# Patient Record
Sex: FEMALE | Race: AMERICAN INDIAN OR ALASKA NATIVE | Employment: FULL TIME | URBAN - NONMETROPOLITAN AREA
[De-identification: names, ages, dates, MRNs, and addresses within clinical notes are randomized per-mention and may not be internally consistent; named-entity substitution may affect disease eponyms.]

---

## 2018-03-14 ENCOUNTER — HOSPITAL ENCOUNTER (EMERGENCY)
Facility: OTHER | Age: 23
Discharge: HOME | End: 2018-03-14

## 2018-03-14 VITALS
HEIGHT: 63 IN | OXYGEN SATURATION: 97 % | RESPIRATION RATE: 18 BRPM | SYSTOLIC BLOOD PRESSURE: 132 MMHG | TEMPERATURE: 99 F | WEIGHT: 190 LBS | HEART RATE: 124 BPM | DIASTOLIC BLOOD PRESSURE: 80 MMHG | BODY MASS INDEX: 33.66 KG/M2

## 2018-03-15 NOTE — ED NOTES
Pt reports she has a throbbing aching pain today that is now just in her neck, her head feels heavy, she has nausea, has taken medications but nothing is helping.  Pt had some pain yesterday but it wasn't as severe as it is today.  She saw a chiropractor today and an hour later her pain started getting worse.  Pain is a stabbing pain in the back of her head that goes to her eyeballs, her head feels like it's going to explode.  This happened before and she got toradol which helped but her pain came back the next day.

## 2018-04-02 DIAGNOSIS — B86 SCABIES: Primary | ICD-10-CM

## 2018-04-02 RX ORDER — PERMETHRIN 50 MG/G
CREAM TOPICAL
Qty: 60 G | Refills: 1 | Status: SHIPPED | OUTPATIENT
Start: 2018-04-02 | End: 2019-02-18

## 2018-04-03 NOTE — PROGRESS NOTES
Here with family. She shows me a rash. Rash on her antecubital spaces, in between fingers and on abd. Appears like scabies. . has same rash, came home from [facility] with the rash.       Lorraine Acosta NP on 4/2/2018 at 8:03 PM

## 2019-02-18 ENCOUNTER — HOSPITAL ENCOUNTER (OUTPATIENT)
Dept: GENERAL RADIOLOGY | Facility: OTHER | Age: 24
Discharge: HOME | End: 2019-02-18
Attending: PHYSICIAN ASSISTANT | Admitting: PHYSICIAN ASSISTANT
Payer: COMMERCIAL

## 2019-02-18 ENCOUNTER — HOSPITAL ENCOUNTER (OUTPATIENT)
Dept: GENERAL RADIOLOGY | Facility: OTHER | Age: 24
End: 2019-02-18
Attending: PHYSICIAN ASSISTANT
Payer: COMMERCIAL

## 2019-02-18 ENCOUNTER — OFFICE VISIT (OUTPATIENT)
Dept: FAMILY MEDICINE | Facility: OTHER | Age: 24
End: 2019-02-18
Attending: PHYSICIAN ASSISTANT
Payer: COMMERCIAL

## 2019-02-18 VITALS
HEART RATE: 84 BPM | DIASTOLIC BLOOD PRESSURE: 68 MMHG | WEIGHT: 215.5 LBS | TEMPERATURE: 98.6 F | BODY MASS INDEX: 36.79 KG/M2 | SYSTOLIC BLOOD PRESSURE: 112 MMHG | HEIGHT: 64 IN

## 2019-02-18 DIAGNOSIS — K59.00 CONSTIPATION, UNSPECIFIED CONSTIPATION TYPE: ICD-10-CM

## 2019-02-18 DIAGNOSIS — M25.512 ACUTE PAIN OF LEFT SHOULDER: Primary | ICD-10-CM

## 2019-02-18 DIAGNOSIS — M25.512 ACUTE PAIN OF LEFT SHOULDER: ICD-10-CM

## 2019-02-18 DIAGNOSIS — R07.89 CHEST WALL PAIN: ICD-10-CM

## 2019-02-18 PROCEDURE — 99214 OFFICE O/P EST MOD 30 MIN: CPT | Performed by: PHYSICIAN ASSISTANT

## 2019-02-18 PROCEDURE — G0463 HOSPITAL OUTPT CLINIC VISIT: HCPCS

## 2019-02-18 PROCEDURE — 71046 X-RAY EXAM CHEST 2 VIEWS: CPT

## 2019-02-18 PROCEDURE — 73030 X-RAY EXAM OF SHOULDER: CPT | Mod: LT

## 2019-02-18 PROCEDURE — G0463 HOSPITAL OUTPT CLINIC VISIT: HCPCS | Mod: 25

## 2019-02-18 RX ORDER — CYCLOBENZAPRINE HCL 5 MG
5 TABLET ORAL 3 TIMES DAILY PRN
Qty: 9 TABLET | Refills: 0 | Status: SHIPPED | OUTPATIENT
Start: 2019-02-18 | End: 2019-02-21

## 2019-02-18 RX ORDER — ACETAMINOPHEN 500 MG
1000 TABLET ORAL
COMMUNITY

## 2019-02-18 ASSESSMENT — MIFFLIN-ST. JEOR: SCORE: 1717.5

## 2019-02-18 ASSESSMENT — PAIN SCALES - GENERAL: PAINLEVEL: EXTREME PAIN (8)

## 2019-02-19 NOTE — PATIENT INSTRUCTIONS
Chest XR negative for abnormality  XR shoulder on left negative for abnormality  Imaging shows gas and bowel in left abdomen - could be referred pain you are feeling in your shoulder  Stomach is non tender on exam today, no nausea, vomiting  Magnesium citrate - for clean out  Push fluids  For shoulder pain, rest shoulder and symptomatic treatments  Ibuprofen 800 mg oral with food 3 times daily, max 2400 mg/day. Take 6-8 hours apart  Tylenol 1000 mg every 4-6 hours, max 4000 mg/day  Apply ice 10-20 minutes every 1-2 hours  Flexeril 5 mg oral tablet, 3 times daily as needed   Follow up with PCP for a recheck in 2 days  Seek immediate care for    Pain or swelling gets worse or continues for more than a few days    Your hand or fingers become cold, blue, numb, or tingly    Large amount of bruising on your shoulder or upper arm    Difficulty moving your hand or fingers    Weakness in your hand or fingers    Your shoulder becomes stiff    It feels like your shoulder is popping out    You are less able to do your daily activities    Nausea, vomiting, abdominal pain, fever > 100.4      Patient Education     Shoulder Pain with Uncertain Cause  Shoulder pain can have many causes. Pain often comes from the structures that surround the shoulder joint. These are the joint capsule, ligaments, tendons, muscles, and bursa. Pain can also come from cartilage in the joint. Cartilage can become worn out or injured. It s important to know what s causing your pain so the healthcare provider can use the correct treatment. But sometimes it s difficult to find the exact cause of shoulder pain. You may need to see a specialist (orthopedist). You may also need special tests such as a CT scan or MRI. The provider may need to use special tools to look inside the joint (arthroscopy).  Shoulder pain can be treated with a sling or a device that keeps your shoulder from moving. You can take an anti-inflammatory medicine such as ibuprofen to ease  pain. You may need to do special shoulder exercises. Follow up with a specialist if the pain is severe or doesn t go away after a few weeks.  Home care  Follow these tips when caring for yourself at home:    If a sling was given to you, leave it in place for the time advised by your healthcare provider. If you aren t sure how long to wear it, ask for advice. If the sling becomes loose, adjust it so that your forearm is level with the ground. Your shoulder should feel well supported.    Put an ice pack on the injured area for 20 minutes every 1 to 2 hours the first day. You can make your own ice pack by putting ice cubes in a plastic bag. Wrap the bag in a thin towel. Continue with ice packs 3 to 4 times a day for the next 2 days. Then use the pack as needed to ease pain and swelling.    You may use acetaminophen or ibuprofen to control pain, unless another pain medicine was prescribed. If you have chronic liver or kidney disease, talk with your healthcare provider before using these medicines. Also talk with your provider if you ve ever had a stomach ulcer or GI bleeding.    Shoulder pain may seem worse at night, when there is less to distract you from the pain. If you sleep on your side, try to keep weight off your painful shoulder. Propping pillows behind you may stop you from rolling over onto that shoulder during sleep.     Shoulder and elbow joints can become stiff if left in a sling for too long. You should start range of motion exercises about 7 to 10 days after the injury. Talk with your provider to find out what type of exercises to do and how soon to start.    You can take the sling off to shower or bathe.  Follow-up care  Follow up with your healthcare provider if you don t start to get better in the next 5 days.  When to seek medical advice  Call your healthcare provider right away if any of these occur:    Pain or swelling gets worse or continues for more than a few days    Your hand or fingers become  cold, blue, numb, or tingly    Large amount of bruising on your shoulder or upper arm    Difficulty moving your hand or fingers    Weakness in your hand or fingers    Your shoulder becomes stiff    It feels like your shoulder is popping out    You are less able to do your daily activities  Date Last Reviewed: 10/1/2016    [phone] [employer]. [address] [place] [address], [place] [address]. All rights reserved. This information is not intended as a substitute for professional medical care. Always follow your healthcare professional's instructions.

## 2019-02-19 NOTE — NURSING NOTE
Patient in clinic for L shoulder pain x 3 days. Started without warning and not associated with any injury. Pain worsens when inhaling, travels to L side down toward diaphragm.    Tx with ibuprofen, tylenol and heat.  Sarah Alvarado LPN....................  2/18/2019   6:32 PM    Chief Complaint   Patient presents with     Shoulder Pain       Medication Reconciliation: complete    Sarah Alvarado LPN

## 2019-02-19 NOTE — PROGRESS NOTES
"SUBJECTIVE:  Lauren Booker is a 24 year old female presents to [facility] for evaluation of left shoulder pain  Mechanism of injury: not known, denies overuse or shoulder strain    Location - left shoulder  Quality - sharp pain, constant  Severity - 8/10  Aggravated by - deep breath, arm extension, lifting or pulling  Alleviated by -  Ibuprofen all day yesterday, nothing today  Associated symptoms - denies cough, runny nose, no abdominal pain  Treatments - last dose of ibuprofen yesterday  Prior fracture or injury - none identified  No prior history of asthma or allergies  No prior history of tobacco use  Patient is not pregnant      Past Medical History:   Diagnosis Date     Personal history of other medical treatment (CODE)     No Comments Provided     Current Outpatient Medications   Medication     acetaminophen (TYLENOL) 500 MG tablet     Prenatal Vit-Fe Fumarate-FA (PRENATAL MULTIVITAMIN  PLUS IRON) 27-0.8 MG TABS     Ferrous Sulfate (IRON SUPPLEMENT PO)     Ranitidine HCl (ZANTAC PO)     No current facility-administered medications for this visit.         Allergies   Allergen Reactions     Mucinex Hives         ROS  General: feels well, no fever  HENT: negative  Respiratory: negative for cough, shortness of breath, wheezing  Chest wall: POSITIVE for lower rib pain on right posterior and anterior lower left rib pain  Musculoskeletal: left shoulder pain  Abdomen: negative for abdominal pain, nausea, vomiting. Eating and drinking normally. Normal bowel. Last BM this AM was normal.   : normal urine      OBJECTIVE:  Vitals:    02/18/19 1836   BP: 112/68   BP Location: Right arm   Patient Position: Sitting   Cuff Size: Adult Large   Pulse: 84   Temp: 98.6  F (37  C)   TempSrc: Tympanic   Weight: 97.8 kg (215 lb 8 oz)   Height: 1.626 m (5' 4\")     Vital signs as noted above.  Appearance: healthy appearing female in moderate distress  Musculoskeletal: Left shoulder normal symmetry shoulders. No abnormality " noted  Inspection: no erythema or ecchymosis  Palpation: non TTP  Neurovascular: normal pulses, cap refill, sensation to soft touch, temperature  ROM: unable to extend past 90 degrees, limited exam for strength due to pain  C-spine - non tender spine and paraspinals, normal ROM without pain or radiculopathy  Cardiac: normal rate, rhythm  Respiratory: normal respiration, clear to ausculation  Chest Wall: non tender to palpation, no abnormality noted to ribs  Abdomen: soft, non tender, normal bowel sounds. Escalante's negative, Mcburney's negative. No rigidity, no rebound. No guarding.     ASSESSMENT:  (M25.512) Acute pain of left shoulder  (primary encounter diagnosis)  Plan: XR Shoulder Left G/E 3 Views, cyclobenzaprine         (FLEXERIL) 5 MG tablet      (R07.89) Chest wall pain  Plan: XR Chest 2 Views      (K59.00) Constipation, unspecified constipation type  Plan: magnesium citrate solution    Chest XR negative for abnormality  XR shoulder on left negative for abnormality  Imaging shows gas and bowel in left abdomen - could be referred pain you are feeling in your shoulder  Stomach is non tender on exam today, no nausea, vomiting. No palpable rib pain.   Magnesium citrate - for clean out of bowel. Take as directed.   Push fluids  Discussed option to add abdominal labs - patient would like to defer this today.     For shoulder pain, rest shoulder and symptomatic treatments  Discussed treatment with tordal in clinic today, patient declined this as it makes her tired.   Ibuprofen 800 mg oral with food 3 times daily, max 2400 mg/day. Take 6-8 hours apart  Tylenol 1000 mg every 4-6 hours, max 4000 mg/day  Apply ice 10-20 minutes every 1-2 hours  Flexeril 5 mg oral tablet, 3 times daily as needed   Follow up with PCP for a recheck in 2 days    Patient received verbal and written instruction including review of warning signs    Rosalba Alicea PA-C on 2/18/2019 at 7:46 PM

## 2020-02-09 ENCOUNTER — OFFICE VISIT (OUTPATIENT)
Dept: FAMILY MEDICINE | Facility: OTHER | Age: 25
End: 2020-02-09
Attending: NURSE PRACTITIONER
Payer: COMMERCIAL

## 2020-02-09 VITALS
BODY MASS INDEX: 38.62 KG/M2 | OXYGEN SATURATION: 98 % | SYSTOLIC BLOOD PRESSURE: 120 MMHG | DIASTOLIC BLOOD PRESSURE: 86 MMHG | HEART RATE: 100 BPM | WEIGHT: 226.2 LBS | HEIGHT: 64 IN | TEMPERATURE: 98.7 F | RESPIRATION RATE: 20 BRPM

## 2020-02-09 DIAGNOSIS — J02.9 SORE THROAT: Primary | ICD-10-CM

## 2020-02-09 DIAGNOSIS — J06.9 VIRAL UPPER RESPIRATORY TRACT INFECTION: ICD-10-CM

## 2020-02-09 LAB
SPECIMEN SOURCE: NORMAL
STREP GROUP A PCR: NOT DETECTED

## 2020-02-09 PROCEDURE — G0463 HOSPITAL OUTPT CLINIC VISIT: HCPCS

## 2020-02-09 PROCEDURE — 99213 OFFICE O/P EST LOW 20 MIN: CPT | Performed by: NURSE PRACTITIONER

## 2020-02-09 PROCEDURE — 87651 STREP A DNA AMP PROBE: CPT | Mod: ZL | Performed by: NURSE PRACTITIONER

## 2020-02-09 ASSESSMENT — PAIN SCALES - GENERAL: PAINLEVEL: SEVERE PAIN (6)

## 2020-02-09 ASSESSMENT — MIFFLIN-ST. JEOR: SCORE: 1761.04

## 2020-02-10 NOTE — PROGRESS NOTES
Nursing Notes:   Emily Kirkland CMA  2020  7:08 PM  Signed  Patient presents to the clinic for sore throat and nasal congestion that started last night. Patient is concerned about strep.  Medication Reconciliation: complete    Emily Kirkland CMA        SUBJECTIVE:   Lauren Booker is a 25 year old female who presents to clinic today for the following health issues:    [facility].  She is concerned about strep throat.  She has had a sore throat nasal congestion since last night.  No cough shortness of breath or wheezing.  She denies fevers or chills.  She is eating and drinking well.  Other people in her house have been ill but nobody has had strep.      Problem list and histories reviewed & adjusted, as indicated.  Additional history: as documented    Patient Active Problem List   Diagnosis     Irregular uterine contractions     Past Surgical History:   Procedure Laterality Date      SECTION      13,no anesthesia problems (spinal)     OTHER SURGICAL HISTORY      2014,[address]907.0,[place]  DELIVERY W POSTPARTUM CARE       Social History     Tobacco Use     Smoking status: Never Smoker     Smokeless tobacco: Never Used   Substance Use Topics     Alcohol use: No     Family History   Problem Relation Age of Onset     Hyperlipidemia Mother         Hyperlipidemia     Family History Negative Father         Good Health     Family History Negative Brother         Good Health     Family History Negative Brother         Good Health         Current Outpatient Medications   Medication Sig Dispense Refill     amitriptyline (ELAVIL) 25 MG tablet Take 25 mg by mouth       acetaminophen (TYLENOL) 500 MG tablet Take 1,000 mg by mouth       Ferrous Sulfate (IRON SUPPLEMENT PO) Take 325 mg by mouth daily.       Allergies   Allergen Reactions     Etonogestrel Swelling     Arm swelling, possible allergic reaction to plastic per patient report and [facility]  "    Mucinex Hives         ROS:  Notable findings in the HPI.       OBJECTIVE:     /86 (BP Location: Left arm)   Pulse 100   Temp 98.7  F (37.1  C)   Resp 20   Ht 1.626 m (5' 4\")   Wt 102.6 kg (226 lb 3.2 oz)   SpO2 98%   Breastfeeding No   BMI 38.83 kg/m    Body mass index is 38.83 kg/m .  GENERAL: healthy, alert and no distress  EYES: Eyes grossly normal to inspection  HENT: normal cephalic/atraumatic, right ear: normal: no effusions, no erythema, normal landmarks, left ear: normal: no effusions, no erythema, normal landmarks, nose and mouth without ulcers or lesions, oropharynx clear, oral mucous membranes moist, tonsillar hypertrophy +1, equal and tonsillar erythema, uvula rises midline  NECK: no adenopathy  RESP: lungs clear to auscultation - no rales, rhonchi or wheezes  CV: regular rates and rhythm, normal S1 S2, no S3 or S4, no murmur, click or rub, peripheral pulses strong and no peripheral edema    Diagnostic Test Results:  Results for orders placed or performed in visit on 02/09/20 (from the past 24 hour(s))   Group A Streptococcus PCR Throat Swab   Result Value Ref Range    Specimen Description Throat     Strep Group A PCR Not Detected NDET^Not Detected       ASSESSMENT/PLAN:     1. Sore throat  - Group A Streptococcus PCR Throat Swab    2. Viral upper respiratory tract infection    PLAN:    URI Adult:  Tylenol, Ibuprofen, Fluids, Rest, OTC cough suppressant/expectorant, OTC decongestant/antihistamine, Saline gargles, Saline nasal spray and Vaporizer    Followup:    If not improving or if condition worsens, follow up with your Primary Care Provider    I explained my diagnostic considerations and recommendations to the patient, who voiced understanding and agreement with the treatment plan. All questions were answered. We discussed potential side effects of any prescribed or recommended therapies, as well as expectations for response to treatments.  She was advised to contact our office if " there is no improvement or worsening of conditions or symptoms.  If s/s worsen or persist, patient will either come back or follow up with PCP.    Disclaimer:  This note consists of words and symbols derived from keyboarding, dictation, or using voice recognition software. As a result, there may be errors in the script that have gone undetected. Please consider this when interpreting information found in this note.      Lorraine Acosta NP, 2/9/2020 7:11 PM

## 2020-02-10 NOTE — PATIENT INSTRUCTIONS
Patient Education     Self-Care for Sore Throats    Sore throats happen for many reasons, such as colds, allergies, and infections caused by viruses or bacteria. In any case, your throat becomes red and sore. Your goal for self-care is to reduce your discomfort while giving your throat a chance to heal.  Moisten and soothe your throat  Tips include the following:    Try a sip of water first thing after waking up.    Keep your throat moist by drinking 6 or more glasses of clear liquids every day.    Run a cool-air humidifier in your room overnight.    Avoid cigarette smoke.     Suck on throat lozenges, cough drops, hard candy, ice chips, or frozen fruit-juice bars. Use the sugar-free versions if your diet or medical condition requires them.  Gargle to ease irritation  Gargling every hour or 2 can ease irritation. Try gargling with 1 of these solutions:    1/4 teaspoon of salt in 1/2 cup of warm water    An over-the-counter anesthetic gargle  Use medicine for more relief  Over-the-counter medicine can reduce sore throat symptoms. Ask your pharmacist if you have questions about which medicine to use:    Ease pain with anesthetic sprays. Aspirin or an aspirin substitute also helps. Remember, never give aspirin to anyone 18 or younger, or if you are already taking blood thinners.     For sore throats caused by allergies, try antihistamines to block the allergic reaction.    Remember: unless a sore throat is caused by a bacterial infection, antibiotics won t help you.  Prevent future sore throats  Prevention tips include the following:    Stop smoking or reduce contact with secondhand smoke. Smoke irritates the tender throat lining.    Limit contact with pets and with allergy-causing substances, such as pollen and mold.    When you re around someone with a sore throat or cold, wash your hands often to keep viruses or bacteria from spreading.    Don t strain your vocal cords.  Contact your healthcare provider if you  have:    A temperature over 101 F (38.3 C)    White spots on the throat    Great difficulty swallowing    Trouble breathing    A skin rash    Recent exposure to someone else with strep bacteria    Severe hoarseness and swollen glands in the neck or jaw  Date Last Reviewed: 8/1/2016 2000-2019 [employer]. [address] [place] [id number]. All rights reserved. This information is not intended as a substitute for professional medical care. Always follow your healthcare professional's instructions.

## 2020-02-10 NOTE — NURSING NOTE
Patient presents to the clinic for sore throat and nasal congestion that started last night. Patient is concerned about strep.  Medication Reconciliation: complete    Emily Kirkland, CMA

## 2020-12-20 ENCOUNTER — HEALTH MAINTENANCE LETTER (OUTPATIENT)
Age: 25
End: 2020-12-20

## 2021-04-21 ENCOUNTER — ALLIED HEALTH/NURSE VISIT (OUTPATIENT)
Dept: FAMILY MEDICINE | Facility: OTHER | Age: 26
End: 2021-04-21
Attending: FAMILY MEDICINE
Payer: COMMERCIAL

## 2021-04-21 DIAGNOSIS — J02.9 SORE THROAT: Primary | ICD-10-CM

## 2021-04-21 PROCEDURE — U0003 INFECTIOUS AGENT DETECTION BY NUCLEIC ACID (DNA OR RNA); SEVERE ACUTE RESPIRATORY SYNDROME CORONAVIRUS 2 (SARS-COV-2) (CORONAVIRUS DISEASE [COVID-19]), AMPLIFIED PROBE TECHNIQUE, MAKING USE OF HIGH THROUGHPUT TECHNOLOGIES AS DESCRIBED BY CMS-2020-01-R: HCPCS | Mod: ZL | Performed by: FAMILY MEDICINE

## 2021-04-21 PROCEDURE — C9803 HOPD COVID-19 SPEC COLLECT: HCPCS

## 2021-04-21 PROCEDURE — U0005 INFEC AGEN DETEC AMPLI PROBE: HCPCS | Mod: ZL | Performed by: FAMILY MEDICINE

## 2021-04-22 LAB
SARS-COV-2 RNA RESP QL NAA+PROBE: NORMAL
SPECIMEN SOURCE: NORMAL

## 2021-04-23 LAB
LABORATORY COMMENT REPORT: ABNORMAL
SARS-COV-2 RNA RESP QL NAA+PROBE: POSITIVE
SPECIMEN SOURCE: ABNORMAL

## 2021-09-01 ENCOUNTER — OFFICE VISIT (OUTPATIENT)
Dept: FAMILY MEDICINE | Facility: OTHER | Age: 26
End: 2021-09-01
Attending: NURSE PRACTITIONER
Payer: COMMERCIAL

## 2021-09-01 VITALS
TEMPERATURE: 97 F | HEIGHT: 64 IN | RESPIRATION RATE: 18 BRPM | OXYGEN SATURATION: 97 % | HEART RATE: 90 BPM | BODY MASS INDEX: 36.58 KG/M2 | WEIGHT: 214.25 LBS | SYSTOLIC BLOOD PRESSURE: 128 MMHG | DIASTOLIC BLOOD PRESSURE: 72 MMHG

## 2021-09-01 DIAGNOSIS — B37.49 YEAST UTI: ICD-10-CM

## 2021-09-01 DIAGNOSIS — R39.89 URINARY PROBLEM: Primary | ICD-10-CM

## 2021-09-01 DIAGNOSIS — N30.01 ACUTE CYSTITIS WITH HEMATURIA: ICD-10-CM

## 2021-09-01 LAB
ALBUMIN UR-MCNC: 20 MG/DL
APPEARANCE UR: CLEAR
BILIRUB UR QL STRIP: NEGATIVE
COLOR UR AUTO: ABNORMAL
GLUCOSE UR STRIP-MCNC: NEGATIVE MG/DL
HGB UR QL STRIP: ABNORMAL
KETONES UR STRIP-MCNC: NEGATIVE MG/DL
LEUKOCYTE ESTERASE UR QL STRIP: ABNORMAL
MUCOUS THREADS #/AREA URNS LPF: PRESENT /LPF
NITRATE UR QL: NEGATIVE
PH UR STRIP: 6.5 [PH] (ref 5–9)
RBC URINE: 26 /HPF
SP GR UR STRIP: 1.02 (ref 1–1.03)
SQUAMOUS EPITHELIAL: 2 /HPF
UROBILINOGEN UR STRIP-MCNC: NORMAL MG/DL
WBC CLUMPS #/AREA URNS HPF: PRESENT /HPF
WBC URINE: 49 /HPF
YEAST #/AREA URNS HPF: ABNORMAL /HPF

## 2021-09-01 PROCEDURE — G0463 HOSPITAL OUTPT CLINIC VISIT: HCPCS

## 2021-09-01 PROCEDURE — 99213 OFFICE O/P EST LOW 20 MIN: CPT | Performed by: PHYSICIAN ASSISTANT

## 2021-09-01 PROCEDURE — 81001 URINALYSIS AUTO W/SCOPE: CPT | Mod: ZL | Performed by: PHYSICIAN ASSISTANT

## 2021-09-01 PROCEDURE — 87086 URINE CULTURE/COLONY COUNT: CPT | Mod: ZL | Performed by: PHYSICIAN ASSISTANT

## 2021-09-01 RX ORDER — SULFAMETHOXAZOLE/TRIMETHOPRIM 800-160 MG
1 TABLET ORAL 2 TIMES DAILY
Qty: 10 TABLET | Refills: 0 | Status: SHIPPED | OUTPATIENT
Start: 2021-09-01 | End: 2021-09-06

## 2021-09-01 RX ORDER — TOPIRAMATE 25 MG/1
25 TABLET, FILM COATED ORAL
COMMUNITY
Start: 2020-11-03

## 2021-09-01 RX ORDER — CYCLOBENZAPRINE HCL 10 MG
10 TABLET ORAL
COMMUNITY
Start: 2020-11-03

## 2021-09-01 RX ORDER — FLUCONAZOLE 150 MG/1
150 TABLET ORAL DAILY
Qty: 3 TABLET | Refills: 0 | Status: SHIPPED | OUTPATIENT
Start: 2021-09-01 | End: 2021-09-04

## 2021-09-01 RX ORDER — ETONOGESTREL AND ETHINYL ESTRADIOL VAGINAL RING .015; .12 MG/D; MG/D
RING VAGINAL
COMMUNITY
Start: 2021-08-24

## 2021-09-01 ASSESSMENT — MIFFLIN-ST. JEOR: SCORE: 1701.83

## 2021-09-01 ASSESSMENT — PAIN SCALES - GENERAL: PAINLEVEL: SEVERE PAIN (7)

## 2021-09-01 NOTE — NURSING NOTE
Patient presents to clinic experiencing burning with urination, retention, pressure, lower back and groin pain rated 7 since 2am this morning.  Medication Reconciliation: complete    Lyn Munoz LPN

## 2021-09-01 NOTE — PROGRESS NOTES
"ASSESSMENT/PLAN:    I have reviewed the nursing notes.  I have reviewed the findings, diagnosis, plan and need for follow up with the patient.    1. Acute cystitis with hematuria  - sulfamethoxazole-trimethoprim (BACTRIM DS) 800-160 MG tablet; Take 1 tablet by mouth 2 times daily for 5 days  Dispense: 10 tablet; Refill: 0    2. Yeast UTI  - fluconazole (DIFLUCAN) 150 MG tablet; Take 1 tablet (150 mg) by mouth daily for 3 days  Dispense: 3 tablet; Refill: 0    3. Urinary problem  - UA reflex to Microscopic and Culture  - Urine Culture    Vitals stable. PE available for review below. UA results: small blood, large LE, WBC clumps, yeast, 26 RBC and 49 WBC. Based off UA results, patient does meet criteria for antibiotic therapy. I did discuss with patient that if a urine culture was performed, that we will inform patient if a change to their treatment plan needs to occur based off culture results. In the meantime, recommend, alternating tylenol and ibuprofen every 4-6 hours as needed, warm heating pad, pushing fluids/hydration, cranberry juice/pills, urinating when urge arises. May also try over the counter remedies such as Azo (as long as pyridium not already prescribed). Patient aware that if they do take Azo, that this medication can change color of urine to an \"orange\" color. If fevers, chills, flank pain/back pain, inability to urinate/struggle to urinate, signs of dehydration or other worrisome signs occur, patient agreeable to follow up for reevaluation. Patient is in agreement and understanding of the above treatment plan. All questions and concerns were addressed and answered to patient's satisfaction. AVS reviewed with patient.     Discussed warning signs/symptoms indicative of need to f/u    Follow up if symptoms persist or worsen or concerns    I explained my diagnostic considerations and recommendations to the patient, who voiced understanding and agreement with the treatment plan. All questions were " answered. We discussed potential side effects of any prescribed or recommended therapies, as well as expectations for response to treatments.    Lucia Iglesias PA-C  2021  10:38 AM    HPI:    Lauren Booker is a 26 year old female  who [facility] for concerns of possible UTI, x 2 am this morning    Symptoms: dysuria, urgency, burning, suprapubic pain and pressure and bilateral low back pain, hesitency  Blood in Urine: No  [facility]: [facility]  Able to Completely Urinate/Void: No  Prior UTIs: Yes  Exposures to STIs/STDs: No  Fevers, chills, N/V/D: No  Change in Bowel Habits: No  Vaginal Symptoms or Discharge: No  Recent swimming/use of hot tubs/swimming pools/lakes: No    LMP: does not get often, thinks last was  - she is on NuvaRing    Treatments tried: none    Denies CP, SOB, calf tenderness. No new medications. Denies exposure to ill or COVID positive patients.     Allergies: Mucinex, Nexplanon    PCP: Kimberlee Aragon NP    Past Medical History:   Diagnosis Date     Personal history of other medical treatment (CODE)     No Comments Provided     Past Surgical History:   Procedure Laterality Date      SECTION      13,no anesthesia problems (spinal)     OTHER SURGICAL HISTORY      2014,[address]21.0,[place]  DELIVERY W POSTPARTUM CARE     Social History     Tobacco Use     Smoking status: Never Smoker     Smokeless tobacco: Never Used   Substance Use Topics     Alcohol use: No     Current Outpatient Medications   Medication Sig Dispense Refill     acetaminophen (TYLENOL) 500 MG tablet Take 1,000 mg by mouth       amitriptyline (ELAVIL) 25 MG tablet Take 25 mg by mouth       cyclobenzaprine (FLEXERIL) 10 MG tablet Take 10 mg by mouth       etonogestrel-ethinyl estradiol (NUVARING) 0.12-0.015 MG/24HR vaginal ring        Ferrous Sulfate (IRON SUPPLEMENT PO) Take 325 mg by mouth daily.       topiramate (TOPAMAX) 25 MG tablet Take 25 mg by mouth       Allergies  "  Allergen Reactions     Etonogestrel Swelling     Arm swelling, possible allergic reaction to plastic per patient report and [facility]     Mucinex Hives     Past medical history, past surgical history, current medications and allergies reviewed and accurate to the best of my knowledge.      ROS:  Refer to HPI    /72 (BP Location: Right arm, Patient Position: Sitting, Cuff Size: Adult Large)   Pulse 90   Temp 97  F (36.1  C) (Tympanic)   Resp 18   Ht 1.626 m (5' 4\")   Wt 97.2 kg (214 lb 4 oz)   LMP  (LMP Unknown)   SpO2 97%   Breastfeeding No   BMI 36.78 kg/m      EXAM:  General Appearance: Well appearing 26-year old female, appropriate appearance for age. No acute distress  Respiratory: normal chest wall and respirations.  Normal effort.  Clear to auscultation bilaterally, no wheezing, crackles or rhonchi.  No increased work of breathing.  No cough appreciated.  Cardiac: RRR with no murmurs  Abdomen: soft, nontender, no rigidity, no rebound tenderness or guarding, normal bowel sounds present  :  No suprapubic tenderness to palpation.  No CVA tenderness to palpation.    Musculoskeletal:  Equal movement of bilateral upper extremities.  Equal movement of bilateral lower extremities.  Normal gait.    Dermatological: no rashes noted of exposed skin  Psychological: normal affect, alert, oriented, and pleasant.     Labs:  UA: small blood, large LE, WBC clumps, yeast, 26 RBC and 49 WBC    Xray:  None     "

## 2021-09-02 ENCOUNTER — HOSPITAL ENCOUNTER (EMERGENCY)
Facility: OTHER | Age: 26
Discharge: HOME | End: 2021-09-02
Attending: FAMILY MEDICINE | Admitting: FAMILY MEDICINE
Payer: COMMERCIAL

## 2021-09-02 VITALS
WEIGHT: 214.29 LBS | BODY MASS INDEX: 36.58 KG/M2 | HEART RATE: 117 BPM | RESPIRATION RATE: 18 BRPM | TEMPERATURE: 99.1 F | SYSTOLIC BLOOD PRESSURE: 147 MMHG | DIASTOLIC BLOOD PRESSURE: 81 MMHG | HEIGHT: 64 IN | OXYGEN SATURATION: 97 %

## 2021-09-02 DIAGNOSIS — N39.0 URINARY TRACT INFECTION WITHOUT HEMATURIA, SITE UNSPECIFIED: ICD-10-CM

## 2021-09-02 LAB
ALBUMIN UR-MCNC: NEGATIVE MG/DL
ANION GAP SERPL CALCULATED.3IONS-SCNC: 10 MMOL/L (ref 3–14)
APPEARANCE UR: CLEAR
BACTERIA #/AREA URNS HPF: ABNORMAL /HPF
BASOPHILS # BLD AUTO: 0.1 10E3/UL (ref 0–0.2)
BASOPHILS NFR BLD AUTO: 1 %
BILIRUB UR QL STRIP: NEGATIVE
BUN SERPL-MCNC: 15 MG/DL (ref 7–25)
CALCIUM SERPL-MCNC: 9.5 MG/DL (ref 8.6–10.3)
CHLORIDE BLD-SCNC: 104 MMOL/L (ref 98–107)
CO2 SERPL-SCNC: 25 MMOL/L (ref 21–31)
COLOR UR AUTO: ABNORMAL
CREAT SERPL-MCNC: 1.02 MG/DL (ref 0.6–1.2)
EOSINOPHIL # BLD AUTO: 0.2 10E3/UL (ref 0–0.7)
EOSINOPHIL NFR BLD AUTO: 2 %
ERYTHROCYTE [DISTWIDTH] IN BLOOD BY AUTOMATED COUNT: 12.6 % (ref 10–15)
GFR SERPL CREATININE-BSD FRML MDRD: 77 ML/MIN/1.73M2
GLUCOSE BLD-MCNC: 102 MG/DL (ref 70–105)
GLUCOSE UR STRIP-MCNC: NEGATIVE MG/DL
HCG UR QL: NEGATIVE
HCT VFR BLD AUTO: 42 % (ref 35–47)
HGB BLD-MCNC: 13.6 G/DL (ref 11.7–15.7)
HGB UR QL STRIP: NEGATIVE
IMM GRANULOCYTES # BLD: 0 10E3/UL
IMM GRANULOCYTES NFR BLD: 0 %
KETONES UR STRIP-MCNC: NEGATIVE MG/DL
LEUKOCYTE ESTERASE UR QL STRIP: NEGATIVE
LYMPHOCYTES # BLD AUTO: 3.8 10E3/UL (ref 0.8–5.3)
LYMPHOCYTES NFR BLD AUTO: 42 %
MCH RBC QN AUTO: 28.6 PG (ref 26.5–33)
MCHC RBC AUTO-ENTMCNC: 32.4 G/DL (ref 31.5–36.5)
MCV RBC AUTO: 88 FL (ref 78–100)
MONOCYTES # BLD AUTO: 0.8 10E3/UL (ref 0–1.3)
MONOCYTES NFR BLD AUTO: 8 %
MUCOUS THREADS #/AREA URNS LPF: PRESENT /LPF
NEUTROPHILS # BLD AUTO: 4.3 10E3/UL (ref 1.6–8.3)
NEUTROPHILS NFR BLD AUTO: 47 %
NITRATE UR QL: NEGATIVE
NRBC # BLD AUTO: 0 10E3/UL
NRBC BLD AUTO-RTO: 0 /100
PH UR STRIP: 6 [PH] (ref 5–9)
PLATELET # BLD AUTO: 326 10E3/UL (ref 150–450)
POTASSIUM BLD-SCNC: 3.8 MMOL/L (ref 3.5–5.1)
RBC # BLD AUTO: 4.76 10E6/UL (ref 3.8–5.2)
RBC URINE: 4 /HPF
SODIUM SERPL-SCNC: 139 MMOL/L (ref 134–144)
SP GR UR STRIP: 1.02 (ref 1–1.03)
UROBILINOGEN UR STRIP-MCNC: NORMAL MG/DL
WBC # BLD AUTO: 9.2 10E3/UL (ref 4–11)
WBC URINE: 8 /HPF

## 2021-09-02 PROCEDURE — 81025 URINE PREGNANCY TEST: CPT | Performed by: FAMILY MEDICINE

## 2021-09-02 PROCEDURE — 99283 EMERGENCY DEPT VISIT LOW MDM: CPT | Performed by: FAMILY MEDICINE

## 2021-09-02 PROCEDURE — 96375 TX/PRO/DX INJ NEW DRUG ADDON: CPT | Performed by: FAMILY MEDICINE

## 2021-09-02 PROCEDURE — 96365 THER/PROPH/DIAG IV INF INIT: CPT | Performed by: FAMILY MEDICINE

## 2021-09-02 PROCEDURE — 80048 BASIC METABOLIC PNL TOTAL CA: CPT | Performed by: FAMILY MEDICINE

## 2021-09-02 PROCEDURE — 99284 EMERGENCY DEPT VISIT MOD MDM: CPT | Mod: 25 | Performed by: FAMILY MEDICINE

## 2021-09-02 PROCEDURE — 85025 COMPLETE CBC W/AUTO DIFF WBC: CPT | Performed by: FAMILY MEDICINE

## 2021-09-02 PROCEDURE — 81003 URINALYSIS AUTO W/O SCOPE: CPT | Performed by: FAMILY MEDICINE

## 2021-09-02 PROCEDURE — 36415 COLL VENOUS BLD VENIPUNCTURE: CPT | Performed by: FAMILY MEDICINE

## 2021-09-02 PROCEDURE — 250N000011 HC RX IP 250 OP 636: Performed by: FAMILY MEDICINE

## 2021-09-02 PROCEDURE — 250N000013 HC RX MED GY IP 250 OP 250 PS 637: Performed by: FAMILY MEDICINE

## 2021-09-02 RX ORDER — PHENAZOPYRIDINE HYDROCHLORIDE 100 MG/1
200 TABLET, FILM COATED ORAL ONCE
Status: COMPLETED | OUTPATIENT
Start: 2021-09-02 | End: 2021-09-02

## 2021-09-02 RX ORDER — ACETAMINOPHEN 500 MG
1000 TABLET ORAL ONCE
Status: COMPLETED | OUTPATIENT
Start: 2021-09-02 | End: 2021-09-02

## 2021-09-02 RX ORDER — CEFUROXIME AXETIL 500 MG/1
500 TABLET ORAL 2 TIMES DAILY
Qty: 20 TABLET | Refills: 0 | Status: SHIPPED | OUTPATIENT
Start: 2021-09-02 | End: 2021-09-12

## 2021-09-02 RX ORDER — CEFTRIAXONE SODIUM 1 G/50ML
1 INJECTION, SOLUTION INTRAVENOUS ONCE
Status: COMPLETED | OUTPATIENT
Start: 2021-09-02 | End: 2021-09-02

## 2021-09-02 RX ORDER — KETOROLAC TROMETHAMINE 15 MG/ML
15 INJECTION, SOLUTION INTRAMUSCULAR; INTRAVENOUS ONCE
Status: COMPLETED | OUTPATIENT
Start: 2021-09-02 | End: 2021-09-02

## 2021-09-02 RX ADMIN — ACETAMINOPHEN 1000 MG: 500 TABLET, FILM COATED ORAL at 22:04

## 2021-09-02 RX ADMIN — CEFTRIAXONE SODIUM 1 G: 1 INJECTION, SOLUTION INTRAVENOUS at 22:04

## 2021-09-02 RX ADMIN — PHENAZOPYRIDINE 200 MG: 100 TABLET ORAL at 21:25

## 2021-09-02 RX ADMIN — KETOROLAC TROMETHAMINE 15 MG: 15 INJECTION, SOLUTION INTRAMUSCULAR; INTRAVENOUS at 21:26

## 2021-09-02 ASSESSMENT — ENCOUNTER SYMPTOMS
FEVER: 0
DIFFICULTY URINATING: 0
VOMITING: 0
COLOR CHANGE: 0
NAUSEA: 0
COUGH: 0
HEADACHES: 0
CHILLS: 0
FREQUENCY: 1
SHORTNESS OF BREATH: 0
SORE THROAT: 0
HEMATURIA: 0
FLANK PAIN: 1
DIARRHEA: 0
ABDOMINAL PAIN: 0
BACK PAIN: 1
DYSURIA: 1

## 2021-09-02 ASSESSMENT — MIFFLIN-ST. JEOR: SCORE: 1702

## 2021-09-02 NOTE — Clinical Note
Lauren Booker was seen and treated in our emergency department on 9/2/2021.  She may return to work on 09/04/2021.       If you have any questions or concerns, please don't hesitate to call.      Bill Montes MD

## 2021-09-03 LAB — BACTERIA UR CULT: ABNORMAL

## 2021-09-03 NOTE — ED PROVIDER NOTES
History     Chief Complaint   Patient presents with     Flank Pain     UTI     HPI  Lauren Booker is a 26 year old female who presents to the emergency room for evaluation of worsening urinary tract infection symptoms.  Patient was seen yesterday in rapid clinic was diagnosed with a UTI and started on Bactrim.  She states that she has had 2 doses of the Bactrim and her symptoms are now worsening.  She complains of bilateral flank pain, bilateral lower back pain, dysuria, frequency, urgency.  She also complains that she has some nausea but denies any vomiting or diarrhea.  No vaginal symptoms.    No fever, chills.  No history of kidney stones.  No further questions or complaints today.    Allergies:  Allergies   Allergen Reactions     Etonogestrel Swelling     Arm swelling, possible allergic reaction to plastic per patient report and [facility]     Mucinex Hives       Problem List:    Patient Active Problem List    Diagnosis Date Noted     Irregular uterine contractions 2013     Priority: Medium        Past Medical History:    Past Medical History:   Diagnosis Date     Personal history of other medical treatment (CODE)        Past Surgical History:    Past Surgical History:   Procedure Laterality Date      SECTION      13,no anesthesia problems (spinal)     OTHER SURGICAL HISTORY      2014,74884.0,ID  DELIVERY [facility]       Family History:    Family History   Problem Relation Age of Onset     Hyperlipidemia Mother         Hyperlipidemia     Family History Negative Father         Good Health     Family History Negative Brother         Good Health     Family History Negative Brother         Good Health       Social History:  Marital Status:   [2]  Social History     Tobacco Use     Smoking status: Never Smoker     Smokeless tobacco: Never Used   Vaping Use     Vaping Use: Never used   Substance Use Topics     Alcohol use: No     Drug use: No     Comment:  "Drug use: No        Medications:    acetaminophen (TYLENOL) 500 MG tablet  cefuroxime (CEFTIN) 500 MG tablet  fluconazole (DIFLUCAN) 150 MG tablet  sulfamethoxazole-trimethoprim (BACTRIM DS) 800-160 MG tablet  amitriptyline (ELAVIL) 25 MG tablet  cyclobenzaprine (FLEXERIL) 10 MG tablet  etonogestrel-ethinyl estradiol (NUVARING) 0.12-0.015 MG/24HR vaginal ring  Ferrous Sulfate (IRON SUPPLEMENT PO)  topiramate (TOPAMAX) 25 MG tablet          Review of Systems   Constitutional: Negative for chills and fever.   HENT: Negative for congestion and sore throat.    Eyes: Negative for visual disturbance.   Respiratory: Negative for cough and shortness of breath.    Cardiovascular: Negative for chest pain.   Gastrointestinal: Negative for abdominal pain, diarrhea, nausea and vomiting.   Genitourinary: Positive for dysuria, flank pain, frequency and urgency. Negative for difficulty urinating, hematuria, pelvic pain and vaginal discharge.   Musculoskeletal: Positive for back pain.   Skin: Negative for color change.   Neurological: Negative for headaches.       Physical Exam   BP: (!) 147/81  Pulse: 117  Temp: 99.1  F (37.3  C)  Resp: 18  Height: 162.6 cm (5' 4\")  Weight: 97.2 kg (214 lb 4.6 oz)  SpO2: 97 %      Physical Exam  Vitals and nursing note reviewed.   Constitutional:       General: She is not in acute distress.     Appearance: Normal appearance. She is well-developed and well-groomed. She is not ill-appearing, toxic-appearing or diaphoretic.   HENT:      Head: Normocephalic and atraumatic.      Right Ear: External ear normal.      Left Ear: External ear normal.      Nose: Nose normal.      Mouth/Throat:      Lips: Pink.      Mouth: Mucous membranes are moist.      Pharynx: Oropharynx is clear. Uvula midline.   Eyes:      Extraocular Movements: Extraocular movements intact.      Conjunctiva/sclera: Conjunctivae normal.      Pupils: Pupils are equal, round, and reactive to light.   Neck:      Trachea: Trachea and " phonation normal.   Cardiovascular:      Rate and Rhythm: Normal rate and regular rhythm.      Pulses: Normal pulses.      Heart sounds: Normal heart sounds. No murmur heard.     Pulmonary:      Effort: Pulmonary effort is normal.      Breath sounds: Normal breath sounds. No decreased breath sounds, wheezing, rhonchi or rales.   Abdominal:      General: Bowel sounds are normal.      Palpations: Abdomen is soft.      Tenderness: There is no abdominal tenderness. There is right CVA tenderness and left CVA tenderness. There is no guarding or rebound.   Musculoskeletal:         General: No tenderness. Normal range of motion.      Cervical back: Normal range of motion and neck supple.      Right lower leg: No edema.      Left lower leg: No edema.   Lymphadenopathy:      Cervical: No cervical adenopathy.   Skin:     General: Skin is warm.      Capillary Refill: Capillary refill takes less than 2 seconds.      Coloration: Skin is not pale.   Neurological:      Mental Status: She is alert and oriented to person, place, and time.   Psychiatric:         Mood and Affect: Mood normal.         Behavior: Behavior normal. Behavior is cooperative.         ED Course     Procedures      Critical Care time:  none    Results for orders placed or performed during the hospital encounter of 09/02/21 (from the past 24 hour(s))   UA with Microscopic reflex to Culture    Specimen: Urine, Clean Catch   Result Value Ref Range    Color Urine Light Yellow Colorless, Straw, Light Yellow, Yellow    Appearance Urine Clear Clear    Glucose Urine Negative Negative mg/dL    Bilirubin Urine Negative Negative    Ketones Urine Negative Negative mg/dL    Specific Gravity Urine 1.021 1.000 - 1.030    Blood Urine Negative Negative    pH Urine 6.0 5.0 - 9.0    Protein Albumin Urine Negative Negative mg/dL    Urobilinogen Urine Normal Normal, 2.0 mg/dL    Nitrite Urine Negative Negative    Leukocyte Esterase Urine Negative Negative    Bacteria Urine Few (A)  None Seen /HPF    Mucus Urine Present (A) None Seen /LPF    RBC Urine 4 (H) <=2 /HPF    WBC Urine 8 (H) <=5 /HPF    Narrative    Urine Culture not indicated   CBC with platelets differential    Narrative    The following orders were created for panel order CBC with platelets differential.  Procedure                               Abnormality         Status                     ---------                               -----------         ------                     CBC with platelets and d...[[id number]]                      Final result                 Please view results for these tests on the individual orders.   Basic metabolic panel   Result Value Ref Range    Sodium 139 134 - 144 mmol/L    Potassium 3.8 3.5 - 5.1 mmol/L    Chloride 104 98 - 107 mmol/L    Carbon Dioxide (CO2) 25 21 - 31 mmol/L    Anion Gap 10 3 - 14 mmol/L    Urea Nitrogen 15 7 - 25 mg/dL    Creatinine 1.02 0.60 - 1.20 mg/dL    Calcium 9.5 8.6 - 10.3 mg/dL    Glucose 102 70 - 105 mg/dL    GFR Estimate 77 >60 mL/min/1.73m2   CBC with platelets and differential   Result Value Ref Range    WBC Count 9.2 4.0 - 11.0 10e3/uL    RBC Count 4.76 3.80 - 5.20 10e6/uL    Hemoglobin 13.6 11.7 - 15.7 g/dL    Hematocrit 42.0 35.0 - 47.0 %    MCV 88 78 - 100 fL    MCH 28.6 26.5 - 33.0 pg    MCHC 32.4 31.5 - 36.5 g/dL    RDW 12.6 10.0 - 15.0 %    Platelet Count 326 150 - 450 10e3/uL    % Neutrophils 47 %    % Lymphocytes 42 %    % Monocytes 8 %    % Eosinophils 2 %    % Basophils 1 %    % Immature Granulocytes 0 %    NRBCs per 100 WBC 0 <1 /100    Absolute Neutrophils 4.3 1.6 - 8.3 10e3/uL    Absolute Lymphocytes 3.8 0.8 - 5.3 10e3/uL    Absolute Monocytes 0.8 0.0 - 1.3 10e3/uL    Absolute Eosinophils 0.2 0.0 - 0.7 10e3/uL    Absolute Basophils 0.1 0.0 - 0.2 10e3/uL    Absolute Immature Granulocytes 0.0 <=0.0 10e3/uL    Absolute NRBCs 0.0 10e3/uL   HCG qualitative urine   Result Value Ref Range    hCG Urine Qualitative Negative Negative       Medications    cefTRIAXone in d5w (ROCEPHIN) intermittent infusion 1 g (1 g Intravenous New Bag 9/2/21 2204)   ketorolac (TORADOL) injection 15 mg (15 mg Intravenous Given 9/2/21 2126)   phenazopyridine (PYRIDIUM) tablet 200 mg (200 mg Oral Given 9/2/21 2125)   acetaminophen (TYLENOL) tablet 1,000 mg (1,000 mg Oral Given 9/2/21 2204)       Assessments & Plan (with Medical Decision Making)     I have reviewed the nursing notes.  Labs reviewed as above.  UA still concerning for infection.  Given the patient's flank pain and low back pain in addition to low-grade fever I am concerned about a developing pyelonephritis.  The patient is treated with Rocephin 1 g IV.  She is given Toradol and Tylenol for her complaints of pain.  When the patient is reevaluated she states that she is feeling much better.  I had a discussion with the patient and the family regarding the symptoms, exam, laboratory results, diagnosis, and plan.    I answered all questions to the best of my ability.  Patient is discharged home in good condition.  Follow-up with primary care physician or rapid clinic as needed.  Return here for any acutely worsening symptoms.  Ibuprofen and Tylenol as needed for pain.  Stop Bactrim.  The patient is given a prescription for Ceftin 500 mg p.o. twice daily x10 days.  The patient voiced understanding of the plan, was agreeable, and had no further questions or concerns. Advised to return for any worsening symptoms.        New Prescriptions    CEFUROXIME (CEFTIN) 500 MG TABLET    Take 1 tablet (500 mg) by mouth 2 times daily for 10 days       Final diagnoses:   Urinary tract infection without hematuria, site unspecified       9/2/2021   [facility]     Bill Montes MD  09/02/21 2207

## 2021-09-03 NOTE — ED TRIAGE NOTES
ED Nursing Triage Note (General)   ________________________________    Lauren CALOS Booker is a 26 year old Female that presents to triage private car  With history of  Bilateral flank pain, recent diagnosis of UTI in rapid clinic 2 days, started bactrim has taken 4 doses, pt stats her flanks no hurt which is new and she feels worse reported by patient   Significant symptoms had onset 3 hour(s) ago.    Patient appears alert , in mild distress., and cooperative behavior.    GCS Total = 15  Airway: intact  Breathing noted as Normal  Circulation Normal  Skin:  Normal  Action taken:  Triage to critical care immediately      PRE HOSPITAL PRIOR LIVING SITUATION Alone

## 2021-09-05 ENCOUNTER — TELEPHONE (OUTPATIENT)
Dept: FAMILY MEDICINE | Facility: OTHER | Age: 26
End: 2021-09-05

## 2021-09-05 DIAGNOSIS — N12 PYELONEPHRITIS: Primary | ICD-10-CM

## 2021-09-05 RX ORDER — CIPROFLOXACIN 500 MG/1
500 TABLET, FILM COATED ORAL 2 TIMES DAILY
Qty: 14 TABLET | Refills: 0 | Status: SHIPPED | OUTPATIENT
Start: 2021-09-05 | End: 2021-09-12

## 2021-09-05 NOTE — TELEPHONE ENCOUNTER
After verifing patients last name and . The below information was given.    Andie Thacker LPN-----2021  3:15 PM

## 2021-09-05 NOTE — TELEPHONE ENCOUNTER
Sent through Cipro 500 mg PO BID x 7 d to [facility] in [place]. Patient to stop Ceftin and start Cipro.     Lucia Iglesias PA-C  9/5/2021  2:44 PM

## 2021-10-03 ENCOUNTER — HEALTH MAINTENANCE LETTER (OUTPATIENT)
Age: 26
End: 2021-10-03

## 2022-01-23 ENCOUNTER — HEALTH MAINTENANCE LETTER (OUTPATIENT)
Age: 27
End: 2022-01-23

## 2022-01-30 ENCOUNTER — ALLIED HEALTH/NURSE VISIT (OUTPATIENT)
Dept: FAMILY MEDICINE | Facility: OTHER | Age: 27
End: 2022-01-30
Attending: FAMILY MEDICINE
Payer: COMMERCIAL

## 2022-01-30 DIAGNOSIS — R51.9 HEADACHE: ICD-10-CM

## 2022-01-30 DIAGNOSIS — Z20.822 EXPOSURE TO 2019 NOVEL CORONAVIRUS: Primary | ICD-10-CM

## 2022-01-30 PROCEDURE — U0003 INFECTIOUS AGENT DETECTION BY NUCLEIC ACID (DNA OR RNA); SEVERE ACUTE RESPIRATORY SYNDROME CORONAVIRUS 2 (SARS-COV-2) (CORONAVIRUS DISEASE [COVID-19]), AMPLIFIED PROBE TECHNIQUE, MAKING USE OF HIGH THROUGHPUT TECHNOLOGIES AS DESCRIBED BY CMS-2020-01-R: HCPCS | Mod: ZL

## 2022-01-30 PROCEDURE — C9803 HOPD COVID-19 SPEC COLLECT: HCPCS

## 2022-01-30 NOTE — PROGRESS NOTES
Patient swabbed for COVID-19 testing for exposure and headache.  Deepali Reese LPN on 1/30/2022 at 1:33 PM

## 2022-01-31 LAB — SARS-COV-2 RNA RESP QL NAA+PROBE: NORMAL

## 2022-02-01 LAB — SARS-COV-2 RNA RESP QL NAA+PROBE: NOT DETECTED

## 2022-09-04 ENCOUNTER — HEALTH MAINTENANCE LETTER (OUTPATIENT)
Age: 27
End: 2022-09-04

## 2023-04-30 ENCOUNTER — HEALTH MAINTENANCE LETTER (OUTPATIENT)
Age: 28
End: 2023-04-30

## 2024-07-07 ENCOUNTER — HEALTH MAINTENANCE LETTER (OUTPATIENT)
Age: 29
End: 2024-07-07

## (undated) RX ORDER — ACETAMINOPHEN 500 MG
TABLET ORAL
Status: DISPENSED
Start: 2021-09-02

## (undated) RX ORDER — KETOROLAC TROMETHAMINE 15 MG/ML
INJECTION, SOLUTION INTRAMUSCULAR; INTRAVENOUS
Status: DISPENSED
Start: 2021-09-02

## (undated) RX ORDER — CEFTRIAXONE SODIUM 1 G/50ML
INJECTION, SOLUTION INTRAVENOUS
Status: DISPENSED
Start: 2021-09-02

## (undated) RX ORDER — PHENAZOPYRIDINE HYDROCHLORIDE 100 MG/1
TABLET, FILM COATED ORAL
Status: DISPENSED
Start: 2021-09-02